# Patient Record
Sex: MALE | URBAN - METROPOLITAN AREA
[De-identification: names, ages, dates, MRNs, and addresses within clinical notes are randomized per-mention and may not be internally consistent; named-entity substitution may affect disease eponyms.]

---

## 2017-03-08 ENCOUNTER — IMPORTED ENCOUNTER (OUTPATIENT)
Dept: URBAN - METROPOLITAN AREA CLINIC 1 | Facility: CLINIC | Age: 52
End: 2017-03-08

## 2017-03-08 PROBLEM — H40.013: Noted: 2017-03-08

## 2017-03-08 PROBLEM — E11.3293: Noted: 2017-03-08

## 2017-03-08 PROBLEM — H00.15: Noted: 2017-03-08

## 2017-03-08 PROBLEM — H25.13: Noted: 2017-03-08

## 2017-03-08 PROBLEM — Z79.4: Noted: 2017-03-08

## 2017-03-08 PROCEDURE — 92015 DETERMINE REFRACTIVE STATE: CPT

## 2017-03-08 PROCEDURE — 92004 COMPRE OPH EXAM NEW PT 1/>: CPT

## 2017-03-08 PROCEDURE — 92250 FUNDUS PHOTOGRAPHY W/I&R: CPT

## 2017-03-08 NOTE — PATIENT DISCUSSION
1.  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 Stressed the importance of strong glucose control. Advised of importance of at least yearly dilated examinations but to contact us immediately for any problems or concerns. Photo was ordered showing mild changes 2. Type II Insulin dependent diabetes mellitus3. Cataract OU: Observe for now without intervention. The patient was advised to contact us if any change or worsening of vision4. Chalazion left upper eyelid -Hot compresses TID x 5 minutes for 3 weeks. If without improvement discussed with patient possible Incision and Drainage procedure. Risks and benefits discussed with patient and patient states full understanding. 5. 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 Unknown Fm HX. Condition was discussed with patient. Will monitor patient for progression. Patient was asked to return to office in 1 month for 10/OCT/VF 24-2. Photo was ordered and done today 6. Hx of stroke affecting OD side and OS eye. Large field deficit due to stroke 7. Return for an appointment in 1 month for 10 OCT and VF 24-2  for COAG suspect with Dr. Moore.

## 2022-04-02 ASSESSMENT — TONOMETRY
OS_IOP_MMHG: 11
OD_IOP_MMHG: 11

## 2022-04-02 ASSESSMENT — VISUAL ACUITY: OD_CC: 20/40
